# Patient Record
Sex: FEMALE | Race: WHITE | NOT HISPANIC OR LATINO | Employment: OTHER | URBAN - METROPOLITAN AREA
[De-identification: names, ages, dates, MRNs, and addresses within clinical notes are randomized per-mention and may not be internally consistent; named-entity substitution may affect disease eponyms.]

---

## 2017-01-13 RX ORDER — OXYCODONE AND ACETAMINOPHEN 10; 325 MG/1; MG/1
1 TABLET ORAL EVERY 6 HOURS PRN
COMMUNITY

## 2017-01-13 RX ORDER — LISINOPRIL 10 MG/1
10 TABLET ORAL DAILY
COMMUNITY

## 2017-01-13 RX ORDER — CITALOPRAM 40 MG/1
40 TABLET ORAL DAILY
COMMUNITY

## 2017-01-13 RX ORDER — TRIAMCINOLONE ACETONIDE 1 MG/G
CREAM TOPICAL 2 TIMES DAILY
COMMUNITY

## 2017-01-13 RX ORDER — AMITRIPTYLINE HYDROCHLORIDE 25 MG/1
25 TABLET, FILM COATED ORAL NIGHTLY
COMMUNITY

## 2017-01-13 RX ORDER — CHOLECALCIFEROL (VITAMIN D3) 1250 MCG
CAPSULE ORAL
COMMUNITY

## 2017-01-13 RX ORDER — ALENDRONATE SODIUM 70 MG/1
70 TABLET ORAL
COMMUNITY

## 2017-01-13 RX ORDER — CYCLOBENZAPRINE HCL 10 MG
10 TABLET ORAL 3 TIMES DAILY PRN
COMMUNITY

## 2017-01-13 RX ORDER — GABAPENTIN 800 MG/1
800 TABLET ORAL 3 TIMES DAILY
COMMUNITY

## 2017-01-16 ENCOUNTER — OFFICE VISIT (OUTPATIENT)
Dept: NEUROSURGERY | Facility: CLINIC | Age: 62
End: 2017-01-16

## 2017-01-16 VITALS
WEIGHT: 192.4 LBS | DIASTOLIC BLOOD PRESSURE: 78 MMHG | SYSTOLIC BLOOD PRESSURE: 122 MMHG | TEMPERATURE: 97.4 F | BODY MASS INDEX: 34.09 KG/M2 | HEIGHT: 63 IN

## 2017-01-16 DIAGNOSIS — M50.30 DEGENERATIVE DISC DISEASE, CERVICAL: Primary | ICD-10-CM

## 2017-01-16 DIAGNOSIS — M47.9 OSTEOARTHRITIS OF SPINE, UNSPECIFIED SPINAL OSTEOARTHRITIS COMPLICATION STATUS, UNSPECIFIED SPINAL REGION: ICD-10-CM

## 2017-01-16 PROCEDURE — 99203 OFFICE O/P NEW LOW 30 MIN: CPT | Performed by: NEUROLOGICAL SURGERY

## 2017-01-16 NOTE — LETTER
January 16, 2017     DAVID Domingo  [address]  [place] [address]    Patient: Dario Gallegos   YOB: 1955   Date of Visit: 1/16/2017       Dear DAVID Gaona:    Dario Gallegos was in my office today. Below is a copy of my note.    If you have questions, please do not hesitate to call me. I look forward to following Dario along with you.         Sincerely,        Sebastian Reyez MD        CC: No Recipients    Dario Gallegos  1955  [id number]      Chief Complaint   Patient presents with   • Neck Pain     This is a 61-year-old female presenting with a chief complaint of pain in the cervical area radiating into her left upper extremity.  She has a protracted history of pain in the cervical and lumbar area related to degenerative osteoarthritis.  She has a genetic predisposition for this.  She is been to physical therapy which has not helped.  The pain is intensified with any type of activities such as pushing pulling and mopping.  She has exacerbations and remissions.  She has slowly deteriorated over the past several years.    The pain in the cervical region radiates into her left shoulder and left upper extremity.  The characteristic and description is atypical.  That in the lumbar area is also atypical.  It is noteworthy that she has fibromyalgia and has pain in multiple joints.  She has no bowel or bladder dysfunction.    She is had a recent MRI which shows the presence of multilevel degenerative disc disease and mild spinal stenosis.  She is referred for neurosurgical interpretation and recommendations.    Past Medical History   Diagnosis Date   • Arthritis    • Fibromyalgia    • Hypertension        Past Surgical History   Procedure Laterality Date   • Appendectomy     • Colon surgery     • Hysterectomy         Family History   Problem Relation Age of Onset   • Cancer Other    • Diabetes Other    • Heart attack Other    • Hypertension Other        Social  History     Social History   • Marital status: Unknown     Spouse name: N/A   • Number of children: N/A   • Years of education: N/A     Occupational History   • Not on file.     Social History Main Topics   • Smoking status: Former Smoker     Quit date: 2010   • Smokeless tobacco: Not on file   • Alcohol use No   • Drug use: Not on file   • Sexual activity: Defer     Other Topics Concern   • Not on file     Social History Narrative       Allergies   Allergen Reactions   • Nsaids          Current Outpatient Prescriptions:   •  alendronate (FOSAMAX) 70 MG tablet, Take 70 mg by mouth Every 7 (Seven) Days., Disp: , Rfl:   •  amitriptyline (ELAVIL) 25 MG tablet, Take 25 mg by mouth Every Night., Disp: , Rfl:   •  Cholecalciferol (VITAMIN D3) 60631 UNITS capsule, Take  by mouth Every 7 (Seven) Days., Disp: , Rfl:   •  citalopram (CeleXA) 40 MG tablet, Take 40 mg by mouth Daily., Disp: , Rfl:   •  cyclobenzaprine (FLEXERIL) 10 MG tablet, Take 10 mg by mouth 3 (Three) Times a Day As Needed for muscle spasms., Disp: , Rfl:   •  gabapentin (NEURONTIN) 800 MG tablet, Take 800 mg by mouth 3 (Three) Times a Day., Disp: , Rfl:   •  lisinopril (PRINIVIL,ZESTRIL) 10 MG tablet, Take 10 mg by mouth Daily., Disp: , Rfl:   •  oxyCODONE-acetaminophen (PERCOCET)  MG per tablet, Take 1 tablet by mouth Every 6 (Six) Hours As Needed for moderate pain (4-6)., Disp: , Rfl:   •  triamcinolone (KENALOG) 0.1 % cream, Apply  topically 2 (Two) Times a Day., Disp: , Rfl:     Review of Systems   Constitutional: Positive for diaphoresis and fatigue. Negative for activity change, appetite change, chills, fever and unexpected weight change.   HENT: Positive for sinus pressure. Negative for congestion, dental problem, drooling, ear discharge, ear pain, facial swelling, hearing loss, mouth sores, nosebleeds, postnasal drip, rhinorrhea, sneezing, sore throat, tinnitus, trouble swallowing and voice change.    Eyes: Negative for photophobia, pain,  "discharge, redness, itching and visual disturbance.   Respiratory: Negative for apnea, cough, choking, chest tightness, shortness of breath, wheezing and stridor.    Cardiovascular: Positive for palpitations (caused by IBU). Negative for chest pain and leg swelling.   Gastrointestinal: Negative for abdominal distention, abdominal pain, anal bleeding, blood in stool, constipation, diarrhea, nausea, rectal pain and vomiting.   Endocrine: Negative for cold intolerance, heat intolerance, polydipsia, polyphagia and polyuria.   Genitourinary: Negative for decreased urine volume, difficulty urinating, dysuria, enuresis, flank pain, frequency, genital sores, hematuria and urgency.   Musculoskeletal: Positive for arthralgias, back pain, myalgias, neck pain and neck stiffness. Negative for gait problem and joint swelling.   Skin: Negative for color change, pallor, rash and wound.   Allergic/Immunologic: Negative for environmental allergies, food allergies and immunocompromised state.   Neurological: Positive for headaches. Negative for dizziness, tremors, seizures, syncope, facial asymmetry, speech difficulty, weakness, light-headedness and numbness.   Hematological: Negative for adenopathy. Does not bruise/bleed easily.   Psychiatric/Behavioral: Positive for dysphoric mood. Negative for agitation, behavioral problems, confusion, decreased concentration, hallucinations, self-injury, sleep disturbance and suicidal ideas. The patient is not nervous/anxious and is not hyperactive.    All other systems reviewed and are negative.      Neurological Examination:    Vitals:    01/16/17 1512   BP: 122/78   BP Location: Right arm   Temp: 97.4 °F (36.3 °C)   TempSrc: Temporal Artery    Weight: 192 lb 6.4 oz (87.3 kg)   Height: 63\" (160 cm)       Mental status/speech: The patient is alert and oriented.  Speech is clear without aphysia or dysarthria.  No overt cognitive deficits.    Cranial nerve examination:    Olfaction: Smell is " intact.  Vision: Vision is intact without visual field abnormalities.  Funduscopic examination is normal.  No pupillary irregularity.  Ocular motor examination: The extraocular muscles are intact.  There is no diplopia.  The pupil is round and reactive to both light and accommodation.  There is no nystagmus.  Facial movement/sensation: There is no facial weakness.  Sensation is intact in the first, second, and third divisions of the trigeminal nerve.  The corneal reflex is intact.  Auditory: Hearing is intact to finger rub bilaterally.  Cranial nerves IX, X, XI, XII: Phonation is normal.  No dysphagia.  Tongue is protruded in the midline without atrophy.  The gag reflex is intact.  Shoulder shrug is normal.    Musculoligamentous ligamentous examination: She has decreased range of motion of the cervical lumbar area.  I'm unable to find weakness, sensory loss or reflex asymmetry.  Her gait is normal.  There is no Babinski, Monty or clonus.  Straight leg raising [place] and flip test are negative.    Medical Decision Making:     Diagnostic Data Set:  MRI shows multilevel degenerative disc disease and mild spinal stenosis.  There is no abnormal signal within the spinal cord.      Assessment:  Moderately severe cervical osteoarthritis          Recommendations:  Though the MRI clearly is not normal surgery is not a consideration because there is no focal abnormality that provides anatomical/clinical correlation and can be approached to remedy the symptoms with which she presents.  Her symptom complex is a mixture of degenerative osteoarthritis superimposed upon fibromyalgia.  I've suggested pain management however she does not wish to pursue of facet block or epidural steroid injection.  There is really very little else to offer her is she is artery been to physical therapy.  Furthermore, she states she is intolerant to NSAIDs because of her stomach.  Given these restrictions I have relinquished her care.  If I can help  further her symptom complex change I would be more than happy to see her at any time.        I greatly appreciate the opportunity to see and evaluate this individual.  If you have questions or concerns regarding issues that I may have overlooked please call me at any time: [phone].  Stephen Reyez M.D.  [facility]  [address] [place] [address]    Scribed for Sebastian Reyez MD by Johnnie Conti CMA. 1/16/2017  3:42 PM    I have read and concur with the information provided by the scribe.

## 2017-01-16 NOTE — PROGRESS NOTES
Dario Gallegos  1955  [id number]      Chief Complaint   Patient presents with   • Neck Pain     This is a 61-year-old female presenting with a chief complaint of pain in the cervical area radiating into her left upper extremity.  She has a protracted history of pain in the cervical and lumbar area related to degenerative osteoarthritis.  She has a genetic predisposition for this.  She is been to physical therapy which has not helped.  The pain is intensified with any type of activities such as pushing pulling and mopping.  She has exacerbations and remissions.  She has slowly deteriorated over the past several years.    The pain in the cervical region radiates into her left shoulder and left upper extremity.  The characteristic and description is atypical.  That in the lumbar area is also atypical.  It is noteworthy that she has fibromyalgia and has pain in multiple joints.  She has no bowel or bladder dysfunction.    She is had a recent MRI which shows the presence of multilevel degenerative disc disease and mild spinal stenosis.  She is referred for neurosurgical interpretation and recommendations.    Past Medical History   Diagnosis Date   • Arthritis    • Fibromyalgia    • Hypertension        Past Surgical History   Procedure Laterality Date   • Appendectomy     • Colon surgery     • Hysterectomy         Family History   Problem Relation Age of Onset   • Cancer Other    • Diabetes Other    • Heart attack Other    • Hypertension Other        Social History     Social History   • Marital status: Unknown     Spouse name: N/A   • Number of children: N/A   • Years of education: N/A     Occupational History   • Not on file.     Social History Main Topics   • Smoking status: Former Smoker     Quit date: 2010   • Smokeless tobacco: Not on file   • Alcohol use No   • Drug use: Not on file   • Sexual activity: Defer     Other Topics Concern   • Not on file     Social History Narrative       Allergies   Allergen  Reactions   • Nsaids          Current Outpatient Prescriptions:   •  alendronate (FOSAMAX) 70 MG tablet, Take 70 mg by mouth Every 7 (Seven) Days., Disp: , Rfl:   •  amitriptyline (ELAVIL) 25 MG tablet, Take 25 mg by mouth Every Night., Disp: , Rfl:   •  Cholecalciferol (VITAMIN D3) 31900 UNITS capsule, Take  by mouth Every 7 (Seven) Days., Disp: , Rfl:   •  citalopram (CeleXA) 40 MG tablet, Take 40 mg by mouth Daily., Disp: , Rfl:   •  cyclobenzaprine (FLEXERIL) 10 MG tablet, Take 10 mg by mouth 3 (Three) Times a Day As Needed for muscle spasms., Disp: , Rfl:   •  gabapentin (NEURONTIN) 800 MG tablet, Take 800 mg by mouth 3 (Three) Times a Day., Disp: , Rfl:   •  lisinopril (PRINIVIL,ZESTRIL) 10 MG tablet, Take 10 mg by mouth Daily., Disp: , Rfl:   •  oxyCODONE-acetaminophen (PERCOCET)  MG per tablet, Take 1 tablet by mouth Every 6 (Six) Hours As Needed for moderate pain (4-6)., Disp: , Rfl:   •  triamcinolone (KENALOG) 0.1 % cream, Apply  topically 2 (Two) Times a Day., Disp: , Rfl:     Review of Systems   Constitutional: Positive for diaphoresis and fatigue. Negative for activity change, appetite change, chills, fever and unexpected weight change.   HENT: Positive for sinus pressure. Negative for congestion, dental problem, drooling, ear discharge, ear pain, facial swelling, hearing loss, mouth sores, nosebleeds, postnasal drip, rhinorrhea, sneezing, sore throat, tinnitus, trouble swallowing and voice change.    Eyes: Negative for photophobia, pain, discharge, redness, itching and visual disturbance.   Respiratory: Negative for apnea, cough, choking, chest tightness, shortness of breath, wheezing and stridor.    Cardiovascular: Positive for palpitations (caused by IBU). Negative for chest pain and leg swelling.   Gastrointestinal: Negative for abdominal distention, abdominal pain, anal bleeding, blood in stool, constipation, diarrhea, nausea, rectal pain and vomiting.   Endocrine: Negative for cold  "intolerance, heat intolerance, polydipsia, polyphagia and polyuria.   Genitourinary: Negative for decreased urine volume, difficulty urinating, dysuria, enuresis, flank pain, frequency, genital sores, hematuria and urgency.   Musculoskeletal: Positive for arthralgias, back pain, myalgias, neck pain and neck stiffness. Negative for gait problem and joint swelling.   Skin: Negative for color change, pallor, rash and wound.   Allergic/Immunologic: Negative for environmental allergies, food allergies and immunocompromised state.   Neurological: Positive for headaches. Negative for dizziness, tremors, seizures, syncope, facial asymmetry, speech difficulty, weakness, light-headedness and numbness.   Hematological: Negative for adenopathy. Does not bruise/bleed easily.   Psychiatric/Behavioral: Positive for dysphoric mood. Negative for agitation, behavioral problems, confusion, decreased concentration, hallucinations, self-injury, sleep disturbance and suicidal ideas. The patient is not nervous/anxious and is not hyperactive.    All other systems reviewed and are negative.      Neurological Examination:    Vitals:    01/16/17 1512   BP: 122/78   BP Location: Right arm   Temp: 97.4 °F (36.3 °C)   TempSrc: Temporal Artery    Weight: 192 lb 6.4 oz (87.3 kg)   Height: 63\" (160 cm)       Mental status/speech: The patient is alert and oriented.  Speech is clear without aphysia or dysarthria.  No overt cognitive deficits.    Cranial nerve examination:    Olfaction: Smell is intact.  Vision: Vision is intact without visual field abnormalities.  Funduscopic examination is normal.  No pupillary irregularity.  Ocular motor examination: The extraocular muscles are intact.  There is no diplopia.  The pupil is round and reactive to both light and accommodation.  There is no nystagmus.  Facial movement/sensation: There is no facial weakness.  Sensation is intact in the first, second, and third divisions of the trigeminal nerve.  The " corneal reflex is intact.  Auditory: Hearing is intact to finger rub bilaterally.  Cranial nerves IX, X, XI, XII: Phonation is normal.  No dysphagia.  Tongue is protruded in the midline without atrophy.  The gag reflex is intact.  Shoulder shrug is normal.    Musculoligamentous ligamentous examination: She has decreased range of motion of the cervical lumbar area.  I'm unable to find weakness, sensory loss or reflex asymmetry.  Her gait is normal.  There is no Babinski, Monty or clonus.  Straight leg raising [place] and flip test are negative.    Medical Decision Making:     Diagnostic Data Set:  MRI shows multilevel degenerative disc disease and mild spinal stenosis.  There is no abnormal signal within the spinal cord.      Assessment:  Moderately severe cervical osteoarthritis          Recommendations:  Though the MRI clearly is not normal surgery is not a consideration because there is no focal abnormality that provides anatomical/clinical correlation and can be approached to remedy the symptoms with which she presents.  Her symptom complex is a mixture of degenerative osteoarthritis superimposed upon fibromyalgia.  I've suggested pain management however she does not wish to pursue of facet block or epidural steroid injection.  There is really very little else to offer her is she is artery been to physical therapy.  Furthermore, she states she is intolerant to NSAIDs because of her stomach.  Given these restrictions I have relinquished her care.  If I can help further her symptom complex change I would be more than happy to see her at any time.        I greatly appreciate the opportunity to see and evaluate this individual.  If you have questions or concerns regarding issues that I may have overlooked please call me at any time: [phone].  Stephen Reyez M.D.  [facility]  [address] [place] [address]    Scribed for Sebastian Reyez MD by Johnnie Conti CMA. 1/16/2017  3:42  PM    I have read and concur with the information provided by the scribe.

## 2017-01-16 NOTE — MR AVS SNAPSHOT
Dario Gallegos   2017 2:30 PM   Office Visit    Dept Phone:  [phone]   Encounter #:  [id number]    Provider:  Sebastian Reyez MD   Department:  [facility]                Your Full Care Plan              Your Updated Medication List          This list is accurate as of: 17  3:45 PM.  Always use your most recent med list.                amitriptyline 25 MG tablet   Commonly known as:  ELAVIL       citalopram 40 MG tablet   Commonly known as:  CeleXA       cyclobenzaprine 10 MG tablet   Commonly known as:  FLEXERIL       FOSAMAX 70 MG tablet   Generic drug:  alendronate       gabapentin 800 MG tablet   Commonly known as:  NEURONTIN       lisinopril 10 MG tablet   Commonly known as:  PRINIVIL,ZESTRIL       oxyCODONE-acetaminophen  MG per tablet   Commonly known as:  PERCOCET       triamcinolone 0.1 % cream   Commonly known as:  KENALOG       Vitamin D3 88433 UNITS capsule               We Performed the Following     SCANNED - IMAGING       You Were Diagnosed With        Codes Comments    Degenerative disc disease, cervical    -  Primary ICD-10-CM: M50.30  ICD-9-CM: 722.4     Osteoarthritis of spine, unspecified spinal osteoarthritis complication status, unspecified spinal region     ICD-10-CM: M47.9  ICD-9-CM: 721.90       Instructions     None    Patient Instructions History      Upcoming Appointments     Visit Type Date Time Department    NEW PATIENT 2017  2:30 PM [facility]      [employer] Signup     [facility] allows you to send messages to your doctor, view your test results, renew your prescriptions, schedule appointments, and more. To sign up, go to Fullscreen and click on the Sign Up Now link in the New User? box. Enter your [employer] Activation Code exactly as it appears below along with the last four digits of your Social Security Number and your Date of Birth () to complete the  "sign-up process. If you do not sign up before the expiration date, you must request a new code.    [employer] Activation Code: [email or web address]  Expires: 1/30/2017  3:45 PM    If you have questions, you can email [email or web address] or call [phone] to talk to our [employer] staff. Remember, [employer] is NOT to be used for urgent needs. For medical emergencies, dial 911.               Other Info from Your Visit           Allergies     Nsaids        Reason for Visit     Neck Pain           Vital Signs     Blood Pressure Temperature Height Weight Body Mass Index Smoking Status    122/78 (BP Location: Right arm) 97.4 °F (36.3 °C) (Temporal Artery ) 63\" (160 cm) 192 lb 6.4 oz (87.3 kg) 34.08 kg/m2 Former Smoker      Problems and Diagnoses Noted     Degeneration of intervertebral disc of cervical region    -  Primary    Arthritis of spine          Results     SCANNED - IMAGING               "

## 2020-08-14 ENCOUNTER — OFFICE VISIT (OUTPATIENT)
Dept: NEUROSURGERY | Facility: CLINIC | Age: 65
End: 2020-08-14

## 2020-08-14 VITALS — HEIGHT: 63 IN | WEIGHT: 185 LBS | TEMPERATURE: 97.4 F | BODY MASS INDEX: 32.78 KG/M2

## 2020-08-14 DIAGNOSIS — M50.30 DDD (DEGENERATIVE DISC DISEASE), CERVICAL: ICD-10-CM

## 2020-08-14 DIAGNOSIS — M54.12 CERVICAL RADICULOPATHY: Primary | ICD-10-CM

## 2020-08-14 DIAGNOSIS — F41.8 ANXIOUS DEPRESSION: ICD-10-CM

## 2020-08-14 DIAGNOSIS — M47.812 CERVICAL ARTHRITIS: ICD-10-CM

## 2020-08-14 DIAGNOSIS — M81.0 OSTEOPOROSIS, UNSPECIFIED OSTEOPOROSIS TYPE, UNSPECIFIED PATHOLOGICAL FRACTURE PRESENCE: ICD-10-CM

## 2020-08-14 PROCEDURE — 99203 OFFICE O/P NEW LOW 30 MIN: CPT | Performed by: NEUROLOGICAL SURGERY

## 2020-08-14 NOTE — PROGRESS NOTES
NAME: CLAIRE CROCKER   DOS: 2020  : 1955  PCP: Mahnaz Monae APRN    Chief Complaint:    Chief Complaint   Patient presents with   • Neck & right arm pain       History of Present Illness:  65 y.o. female   I saw this 65-year-old female in neurosurgical consultation she has a history of fibromyalgia she lives with a  who has COPD and she has been on chronic opioid therapy for a number years she probably has PTSD and does not like needles    She presents with a chronic history of axial neck pain for 3 years the pain radiates most recently in the right neck and arm radiates to the hand I am a second opinion.  She was told she needed emergency surgery    She presents with some numbness and tingling decreased range of motion of the right arm and shoulder she denies any bowel bladder incontinence issues she has been through therapies in the past but nothing recently and she is currently taking multiple medications the pain is pretty much ever present and is gotten worse in the last 2 weeks she is here for evaluation    PMHX  Allergies:  Allergies   Allergen Reactions   • Nsaids      Medications    Current Outpatient Medications:   •  alendronate (FOSAMAX) 70 MG tablet, Take 70 mg by mouth Every 7 (Seven) Days., Disp: , Rfl:   •  amitriptyline (ELAVIL) 25 MG tablet, Take 25 mg by mouth Every Night., Disp: , Rfl:   •  Cholecalciferol (VITAMIN D3) 48114 UNITS capsule, Take  by mouth Every 7 (Seven) Days., Disp: , Rfl:   •  citalopram (CeleXA) 40 MG tablet, Take 40 mg by mouth Daily., Disp: , Rfl:   •  cyclobenzaprine (FLEXERIL) 10 MG tablet, Take 10 mg by mouth 3 (Three) Times a Day As Needed for muscle spasms., Disp: , Rfl:   •  gabapentin (NEURONTIN) 800 MG tablet, Take 800 mg by mouth 3 (Three) Times a Day., Disp: , Rfl:   •  lisinopril (PRINIVIL,ZESTRIL) 10 MG tablet, Take 10 mg by mouth Daily., Disp: , Rfl:   •  oxyCODONE-acetaminophen (PERCOCET)  MG per tablet, Take 1 tablet by mouth  Every 6 (Six) Hours As Needed for moderate pain (4-6)., Disp: , Rfl:   •  triamcinolone (KENALOG) 0.1 % cream, Apply  topically 2 (Two) Times a Day., Disp: , Rfl:   Past Medical History:  Past Medical History:   Diagnosis Date   • Arthritis    • Asthma    • Diabetes mellitus (CMS/HCC)    • Fibromyalgia    • Hypertension      Past Surgical History:  Past Surgical History:   Procedure Laterality Date   • ANKLE SURGERY Right 2012    Rods & screws    • APPENDECTOMY     • COLON SURGERY     • HYSTERECTOMY       Social Hx:  Social History     Tobacco Use   • Smoking status: Former Smoker     Last attempt to quit: 2010     Years since quitting: 10.6   • Smokeless tobacco: Never Used   Substance Use Topics   • Alcohol use: No   • Drug use: Defer     Family Hx:  Family History   Problem Relation Age of Onset   • Cancer Other    • Diabetes Other    • Heart attack Other    • Hypertension Other      Review of Systems:        Review of Systems   Constitutional: Negative for activity change, appetite change, chills, diaphoresis, fatigue, fever and unexpected weight change.   HENT: Negative for congestion, dental problem, drooling, ear discharge, ear pain, facial swelling, hearing loss, mouth sores, nosebleeds, postnasal drip, rhinorrhea, sinus pressure, sneezing, sore throat, tinnitus, trouble swallowing and voice change.    Eyes: Negative for photophobia, pain, discharge, redness, itching and visual disturbance.   Respiratory: Negative for apnea, cough, choking, chest tightness, shortness of breath, wheezing and stridor.    Cardiovascular: Negative for chest pain, palpitations and leg swelling.   Gastrointestinal: Negative for abdominal distention, abdominal pain, anal bleeding, blood in stool, constipation, diarrhea, nausea, rectal pain and vomiting.   Endocrine: Negative for cold intolerance, heat intolerance, polydipsia, polyphagia and polyuria.   Genitourinary: Negative for decreased urine volume, difficulty urinating,  dysuria, enuresis, flank pain, frequency, genital sores, hematuria and urgency.   Musculoskeletal: Positive for back pain and neck stiffness. Negative for arthralgias, gait problem, joint swelling, myalgias and neck pain.   Skin: Negative for color change, pallor, rash and wound.   Allergic/Immunologic: Negative for environmental allergies, food allergies and immunocompromised state.   Neurological: Positive for headaches. Negative for dizziness, tremors, seizures, syncope, facial asymmetry, speech difficulty, weakness, light-headedness and numbness.   Hematological: Negative for adenopathy. Does not bruise/bleed easily.   Psychiatric/Behavioral: Negative for agitation, behavioral problems, confusion, decreased concentration, dysphoric mood, hallucinations, self-injury, sleep disturbance and suicidal ideas. The patient is not nervous/anxious and is not hyperactive.    All other systems reviewed and are negative.       I have reviewed this note template and all pertinent parts of the review of systems social, family history, surgical history and medication list    Physical Examination:  Vitals:    08/14/20 0844   Temp: 97.4 °F (36.3 °C)      General Appearance:   Well developed, well nourished, well groomed, alert, and cooperative.  Neurological examination:  Neurologic Exam  Vital signs were reviewed and documented in the chart  Patient appeared in good neurologic function with normal comprehension fluent speech  Mood and affect are normal  Sense of smell deferred    Pupils symmetric equally reactive funduscopic exam not visualized   Visual fields intact to confrontation  Extraocular movements intact  Face motor function is symmetric  Facial sensations normal  Hearing intact to finger rub hearing intact to finger rub  Tongue is midline  Palate symmetric  Swallowing normal  Shoulder shrug normal  Decreased range of motion neck with arthritic crepitus is    Decreased right shoulder range of motion  Muscle bulk and  tone normal  5 out of 5 strength no motor drift  Gait normal intact  Negative Romberg  No clonus long tract signs or myelopathy    Reflexes symmetric-trace  No edema noted and extremities skin appears normal      Back is without any lesions or abnormality  Arms are warm and well perfused        Review of Imaging/DATA:  I reviewed her MRI personally she is got multilevel degenerative disc disease at the 5 6 and 6 7 levels particularly she has intraforaminal disease is worse on the right side 4 5 contributes to lack of cervical lordosis  Diagnoses/Plan:    Ms. Gallegos is a 65 y.o. female   I believe she is suffering from an acute exacerbation of cervical radiculopathy probably from C5-6 and 6 7 but in combination with loss of cervical lordosis at the C4-5 area.  I discussed with her treatment options I think that she would be a surgical candidate but does have risk factors for poor surgical outcome in terms of pain relief given prior opioid use, anxiety, and likely history of osteoporosis related to past smoking    Would like a CT scan to review her bone density as well as to look at surgical osteophyte removal given the quality of her MRI as well as a cervical flexion-extension film to check for inducible instability    She needs to meet with Cristiana Pope regarding some coping skills regarding PTSD and needle exposure she carries a lot of anxiety about this is likely going to complicate her anthony-surgical care    And I also like her to form a relationship with an interventional pain specialist to discuss treatment options.

## 2020-08-17 ENCOUNTER — TELEPHONE (OUTPATIENT)
Dept: NEUROSURGERY | Facility: CLINIC | Age: 65
End: 2020-08-17

## 2020-08-17 NOTE — TELEPHONE ENCOUNTER
Needs myelogram.  Patient also needs to see Cristiana prather for coping skills and PTSD.  Be sure both these are done prior to being seen again.

## 2020-08-17 NOTE — TELEPHONE ENCOUNTER
"Provider: Bon   Caller: Dario  Time of call:     Phone #: [phone]   Surgery:    Surgery Date:    Last visit: 8/14/2020     Next visit: n/a    VANI:         Reason for call:         Patient left a message stating that she has \"worried her self to death about the myelogram\". She does not want to proceed with this, and wants to know if Dr. Crockett could do her surgery without the Myelogram.  "

## 2020-08-18 NOTE — TELEPHONE ENCOUNTER
Called and spoke with the the patient. She still is not wanting to do the Myelogram. She said that she will think about it some more and call me back.

## 2020-08-19 ENCOUNTER — TELEPHONE (OUTPATIENT)
Dept: PAIN MEDICINE | Facility: CLINIC | Age: 65
End: 2020-08-19

## 2020-08-19 NOTE — TELEPHONE ENCOUNTER
----- Message from Rafa To MD sent at 8/14/2020 10:11 PM EDT -----  READY TO SCHEDULE    Please copy on tel note    FROM Dr Crockett on 8/14/2020   FOR Neck & right arm pain    CT scan ordered   TO Cristiana Pope regarding some coping skills regarding PTSD and needle exposure   For interventional pain       MRI SCANNED IN EPIC     history of fibromyalgia   on chronic opioid therapy for a number years   PTSD   does not like needles     chronic history of axial neck pain for 3 years the pain radiates most recently in the right neck and arm radiates to the hand   numbness and tingling decreased range of motion of the right arm and shoulder   denies any bowel bladder incontinence   taking multiple medications     alendronate   amitriptyline   citalopram   cyclobenzaprine   gabapentin   PERCOCET    Arthritis, Asthma, Diabetes mellitus, Fibromyalgia, Hypertension      MRI multilevel degenerative disc disease at the 5 6 and 6 7 levels particularly she has intraforaminal disease is worse on the right side 4 5 contributes to lack of cervical lordosis    cervical radiculopathy probably from C5-6 and 6 7   surgical candidate   poor surgical outcome in terms of pain relief given prior opioid use, anxiety, and likely history of osteoporosis